# Patient Record
Sex: MALE | Race: WHITE
[De-identification: names, ages, dates, MRNs, and addresses within clinical notes are randomized per-mention and may not be internally consistent; named-entity substitution may affect disease eponyms.]

---

## 2022-01-07 ENCOUNTER — HOSPITAL ENCOUNTER (EMERGENCY)
Dept: HOSPITAL 63 - ER | Age: 57
Discharge: HOME | End: 2022-01-07
Payer: COMMERCIAL

## 2022-01-07 VITALS — WEIGHT: 174.17 LBS | BODY MASS INDEX: 27.99 KG/M2 | HEIGHT: 66 IN

## 2022-01-07 VITALS — DIASTOLIC BLOOD PRESSURE: 90 MMHG | SYSTOLIC BLOOD PRESSURE: 128 MMHG

## 2022-01-07 DIAGNOSIS — S46.911A: Primary | ICD-10-CM
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
## 2022-01-07 PROCEDURE — 96372 THER/PROPH/DIAG INJ SC/IM: CPT

## 2022-01-07 PROCEDURE — 73090 X-RAY EXAM OF FOREARM: CPT

## 2022-01-07 PROCEDURE — 99284 EMERGENCY DEPT VISIT MOD MDM: CPT

## 2022-01-07 PROCEDURE — 73080 X-RAY EXAM OF ELBOW: CPT

## 2022-01-07 NOTE — RAD
XR ELBOW COMPLETE_RIGHT 3+ VIEWS, XR FOREARM_RIGHT 2 VIEWS



Clinical Indication: Reason: pain after trauma / Spl. Instructions:  / History: 



Comparison: None. 



Findings: 

There is no acute fracture or dislocation. No evidence of joint effusion. There are mild degenerative
 changes. There is no radiopaque foreign body. The soft tissues are normal.



No acute fracture of the radius or ulna. No obvious deformity of the wrist. No soft tissue swelling o
f the forearm.



IMPRESSION:

No acute fracture. 



Electronically signed by: Viraj Galindo MD (1/7/2022 11:46 AM) [id number]

## 2022-01-07 NOTE — PHYS DOC
Past History


Additional Past Medical Histor:  r elbow fractures


 (CONNOR SANCHEZ)


Past Surgical History:  No Surgical History


 (CONNOR SANCHEZ)


Alcohol Use:  None


 (CONNOR SANCHEZ)





General Adult


EDM:


Chief Complaint:  ELBOW PROBLEM





HPI:


HPI:





Patient is a 56 year old male who presents with right elbow pain.  Patient 

reports that yesterday, he was moving large sheets of plywood with his arm fully

extended, and the rest of the word resting on his shoulder.  He states he went 

to readjust his , and had pain on the posterior aspect of his elbow 

radiating around to the front.  He now reports right hand swelling as well.  

Patient states he has broken his elbow twice in the past, over 20 years ago.  He

has no abrasions or lacerations.  Patient has no other trauma, injury or pain.


 (CONNOR SANCHEZ)





Review of Systems:


Review of Systems:


Constitutional:  Denies fever, chills or generalized weakness


Eyes:  Denies change in visual acuity, visual field deficits or discharge


HENT:  Denies ear pain, nasal congestion or sore throat 


Respiratory:  Denies cough or shortness of breath 


Cardiovascular:  Denies chest pain, palpitations or edema 


GI:  Denies abdominal pain, nausea, vomiting, bloody stools or diarrhea 


: Denies dysuria or hematuria


Musculoskeletal:  See HPI


Integument:  See HPI


Neurologic:  Denies headache, focal weakness or sensory changes


 (CONNOR SANCHEZ)





Current Medications:


Current Meds:





Current Medications








 Medications


  (Trade)  Dose


 Ordered  Sig/[facility]  Start Time


 Stop Time Status Last Admin


Dose Admin


 


 Ketorolac


 Tromethamine


  (Toradol Im)  60 mg  1X  ONCE  1/7/22 11:00


 1/7/22 11:01 DC  











 (CONNOR SANCHEZ)





Allergies:


Allergies:





Allergies








Coded Allergies Type Severity Reaction Last Updated Verified


 


  No Known Drug Allergies    1/7/22 No








 (CONNOR SANCHEZ)





Physical Exam:


PE:





Constitutional: Well developed, well nourished, no acute distress, non-toxic 

appearance. 


Cardiovascular: Heart rate regular rhythm, no murmur.


Lungs & Thorax: Bilateral breath sounds clear to auscultation.


Skin: Warm, dry, no erythema, no rash, no abrasion, no laceration.


Extremities: Right elbow with posterior swelling and tenderness, soft 

compartments, right hand nonpitting edema,  strength 5/5 bilaterally, radial

pulses 2+ and symmetrical, cap refill less than 2 seconds.  Extremities 

otherwise no tenderness, no cyanosis, no clubbing, ROM intact, no edema.  


Neurologic: Alert and oriented x4, steady and symmetrical gait, no focal 

deficits noted. 


 (CONNOR SANCHEZ)





Current Patient Data:


Vital Signs:





                                   Vital Signs








  Date Time  Temp Pulse Resp B/P (MAP) Pulse Ox O2 Delivery O2 Flow Rate FiO2


 


1/7/22 10:34 98.5 61 18 128/90 (103) 98   








 (CONNOR SANCHEZ)





Radiology/Procedures:


Radiology/Procedures:


XR ELBOW COMPLETE_RIGHT 3+ VIEWS, XR FOREARM_RIGHT 2 VIEWS





Clinical Indication: Reason: pain after trauma / Spl. Instructions:  / History: 





Comparison: None. 





Findings: 


There is no acute fracture or dislocation. No evidence of joint effusion. There 

are mild degenerative changes. There is no radiopaque foreign body. The soft 

tissues are normal.





No acute fracture of the radius or ulna. No obvious deformity of the wrist. No 

soft tissue swelling of the forearm.





IMPRESSION:


No acute fracture. 





Electronically signed by: Viraj Galindo MD (1/7/2022 11:46 AM) [id number]


 (CONNOR SANCHEZ)





Heart Score:


C/O Chest Pain:  No


 (CONNOR SANCHEZ)





Course & Med Decision Making:


Course & Med Decision Making


Pertinent Labs and Imaging studies reviewed. (See chart for details)





Patient is a 56-year-old male with right elbow pain that began yesterday while 

carrying large pieces of plywood.  Patient reports he has broken the elbow twice

before.  Work-up today will include plain films.  Patient counseled on 

advantages and limitations to x-ray imaging.  He was provided with Toradol in 

the department for pain control.





No acute fracture or dislocation is seen on x-ray images.  Patient counseled on 

RICE therapy for joint injuries.  He was given return precautions and orthopedic

contact information for follow-up.  Patient understands and is agreeable to 

discharge plan.


 (CONNOR SANCHEZ)


Course & Med Decision Making


I was the Attending physician on the above date of service of this patient. This

patient was evaluated, examined, treated, and dispositioned from the emergency 

department by the mid-level practitioner.  Although I was working at the time , 

no assistance was requested. 





Electronically signed, Agus Kiran DO


 (AGUS KIRAN DO)


Kwasi Disclaimer:


Dragon Disclaimer:


This electronic medical record was generated, in whole or in part, using a voice

recognition dictation system.


 (CONNOR SANCHEZ)





Departure


Departure:


Impression:  


   Primary Impression:  


   Strain of elbow, right


   Qualified Codes:  S46.911A - Strain of unspecified muscle, fascia and tendon 

   at shoulder and upper arm level, right arm, initial encounter


Disposition:  01 HOME / SELF CARE / HOMELESS


Condition:  STABLE


Referrals:  


PCP,NO (PCP)








[facility]


Patient Instructions:  RICE - Routine Care for Injuries, Easy-to-Read





Additional Instructions:  


EMERGENCY DEPARTMENT GENERAL DISCHARGE INSTRUCTIONS


Thank you for coming to [facility] (ED) today and trusting us

with you care.  We trust that you had a positivie experience in our Emergency 

Department.  If you wish to speak to the department management, you may call the

director at [phone].





YOUR FOLLOW UP INSTRUCTIONS ARE AS FOLLOWS:


1.  Do you have a private doctor?  If you do not have a private doctor, please 

ask for a resource list of physicians or clinics that may be able to assist you 

with follow up care.


2.  The Emergency Physician has interpreted your x-rays.  The X-Ray specialist 

will also review them.  If there is a change in the findings, you will be 

notified in 48 hours when at all possible.


3.  Take over-the-counter NSAIDs such as Advil (ibuprofen) or Aleve (naproxen) 

for pain control. Keep your hand above the level of your heart to prevent 

dependent edema (swelling).





ADDITIONAL INSTRUCTIONS AND INFORMATION:


1.  Your care today has been supervised by a physician who is specially trained 

in emergency care.  Many problems require more than one evaluation for a 

complete diagnosis and treatment.  We recommend that you schedule your follow up

appointment as recommended to ensure complete treatment of you illness or 

injury.  If you are unable to obtain follow up care and continue to have a 

problem, or if your condition worsens, we recommend that you return to the ED.


2.  We are not able to safely determine your condition over the phone nor are we

able to give sound medical advice over the phone.  For these safety reasons, if 

you call for medical advice we will ask you to come to the ED for further 

evaluation.


3.  If you have any questions regarding these discharge instructions please call

the ED at [phone].





SAFETY INFORMATION:


In the interest of safety, wellness, and injury prevention; we encourage you to 

wear your seat belt, if you smoke; quite smoking, and we encourage family to use

a protective helmet for bicycling and other sporting events that present an 

increased risk for head injury.





IF YOUR SYMPTOMS WORSEN OR NEW SYMPTOMS DEVELOP, OR YOU HAVE CONCERNS ABOUT YOUR

CONDITION; OR IF YOUR CONDITION WORSENS WHILE YOU ARE WAITING FOR YOUR FOLLOW UP

APPOINTMENT; EITHER CONTACT YOUR PRIMARY CARE DOCTOR, THE PHYSICIAN WHOSE NAME 

AND NUMBER YOU WERE GIVEN, OR RETURN TO THE ED IMMEDIATELY.











CONNOR SANCHEZ               Jan 7, 2022 11:21


AGUS KIRAN DO                  Jan 8, 2022 06:40